# Patient Record
Sex: MALE | Race: OTHER | Employment: UNEMPLOYED | URBAN - METROPOLITAN AREA
[De-identification: names, ages, dates, MRNs, and addresses within clinical notes are randomized per-mention and may not be internally consistent; named-entity substitution may affect disease eponyms.]

---

## 2020-07-07 ENCOUNTER — APPOINTMENT (OUTPATIENT)
Dept: GENERAL RADIOLOGY | Age: 45
End: 2020-07-07

## 2020-07-07 ENCOUNTER — HOSPITAL ENCOUNTER (EMERGENCY)
Age: 45
Discharge: HOME | End: 2020-07-07
Attending: EMERGENCY MEDICINE

## 2020-07-07 VITALS
TEMPERATURE: 98.2 F | OXYGEN SATURATION: 100 % | HEART RATE: 60 BPM | RESPIRATION RATE: 16 BRPM | SYSTOLIC BLOOD PRESSURE: 133 MMHG | DIASTOLIC BLOOD PRESSURE: 96 MMHG

## 2020-07-07 LAB
GLUCOSE BLD-MCNC: 110 MG/DL (ref 70–99)
PERFORMED ON: ABNORMAL

## 2020-07-07 PROCEDURE — 73560 X-RAY EXAM OF KNEE 1 OR 2: CPT

## 2020-07-07 PROCEDURE — 6370000000 HC RX 637 (ALT 250 FOR IP): Performed by: STUDENT IN AN ORGANIZED HEALTH CARE EDUCATION/TRAINING PROGRAM

## 2020-07-07 PROCEDURE — 99283 EMERGENCY DEPT VISIT LOW MDM: CPT

## 2020-07-07 PROCEDURE — 6360000002 HC RX W HCPCS: Performed by: STUDENT IN AN ORGANIZED HEALTH CARE EDUCATION/TRAINING PROGRAM

## 2020-07-07 PROCEDURE — 73590 X-RAY EXAM OF LOWER LEG: CPT

## 2020-07-07 PROCEDURE — 96372 THER/PROPH/DIAG INJ SC/IM: CPT

## 2020-07-07 PROCEDURE — 73610 X-RAY EXAM OF ANKLE: CPT

## 2020-07-07 RX ORDER — IBUPROFEN 800 MG/1
800 TABLET ORAL EVERY 8 HOURS PRN
Qty: 30 TABLET | Refills: 0 | Status: SHIPPED | OUTPATIENT
Start: 2020-07-07

## 2020-07-07 RX ORDER — ACETAMINOPHEN 325 MG/1
650 TABLET ORAL ONCE
Status: COMPLETED | OUTPATIENT
Start: 2020-07-07 | End: 2020-07-07

## 2020-07-07 RX ORDER — CAPSAICIN 0.025 %
CREAM (GRAM) TOPICAL
Qty: 50 G | Refills: 1 | Status: SHIPPED | OUTPATIENT
Start: 2020-07-07 | End: 2020-08-06

## 2020-07-07 RX ORDER — KETOROLAC TROMETHAMINE 30 MG/ML
15 INJECTION, SOLUTION INTRAMUSCULAR; INTRAVENOUS ONCE
Status: COMPLETED | OUTPATIENT
Start: 2020-07-07 | End: 2020-07-07

## 2020-07-07 RX ADMIN — ACETAMINOPHEN 650 MG: 325 TABLET ORAL at 16:15

## 2020-07-07 RX ADMIN — KETOROLAC TROMETHAMINE 15 MG: 30 INJECTION, SOLUTION INTRAMUSCULAR at 16:15

## 2020-07-07 ASSESSMENT — PAIN DESCRIPTION - FREQUENCY: FREQUENCY: CONTINUOUS

## 2020-07-07 ASSESSMENT — PAIN DESCRIPTION - ORIENTATION
ORIENTATION: RIGHT
ORIENTATION: RIGHT

## 2020-07-07 ASSESSMENT — PAIN - FUNCTIONAL ASSESSMENT: PAIN_FUNCTIONAL_ASSESSMENT: ACTIVITIES ARE NOT PREVENTED

## 2020-07-07 ASSESSMENT — PAIN SCALES - GENERAL
PAINLEVEL_OUTOF10: 8
PAINLEVEL_OUTOF10: 2
PAINLEVEL_OUTOF10: 8

## 2020-07-07 ASSESSMENT — PAIN DESCRIPTION - PROGRESSION: CLINICAL_PROGRESSION: GRADUALLY WORSENING

## 2020-07-07 ASSESSMENT — PAIN DESCRIPTION - LOCATION
LOCATION: LEG
LOCATION: LEG

## 2020-07-07 ASSESSMENT — PAIN DESCRIPTION - DESCRIPTORS: DESCRIPTORS: PINS AND NEEDLES;BURNING

## 2020-07-07 ASSESSMENT — PAIN DESCRIPTION - PAIN TYPE: TYPE: ACUTE PAIN

## 2020-07-07 ASSESSMENT — PAIN DESCRIPTION - ONSET: ONSET: GRADUAL

## 2020-07-07 NOTE — ED NOTES
Pt resting in bed no needs at this time will continue to monitor.       Devante Mattson RN  07/07/20 5668

## 2020-07-07 NOTE — ED PROVIDER NOTES
reports previous drug use. Medications     Discharge Medication List as of 7/7/2020  5:34 PM          Allergies     He has No Known Allergies. Physical Exam     INITIAL VITALS: BP: 128/86, Temp: 98.2 °F (36.8 °C), Pulse: 69, Resp: 18, SpO2: 100 %     General:  Nontoxic appearing, well-developed, well-nourished male     HEENT:  Normocephalic, atraumatic     Eyes: Anicteric, EOMI, PERRLA     Neck:  Supple, full ROM    Pulmonary:   Clear to auscultation bilaterally, no wheezes, rales, rhonchi    Cardiac:  Regular rate and rhythm, no murmurs, gallops, or rubs     Abdomen:  Soft, nondistended, nontender. No masses. No guarding, no rebound     Extremities:  Warm, 2+ radial pulses, 2+ DP pulses. The R anterior knee is TTP along the inferior aspect. No point tenderness. No obvious deformity. No warmth, redness, effusion. Popliteal fossa on the affected side without swelling. Strength 5/5 in the affected extremity in all joints on flexion and extension. FROM on passive ranging without significant pain, mild crepitus palpated on motion ranging. Ambulatory with normal gait, able to bear entire body weight on RLE. LLE normal. Feet normal bilaterally without wounds or lacerations. Skin: No rashes or bruises    Neuro:   Awake, alert, moving UE and LE spontaneously    Psych:   Mood and affect appropriate, answering questions appropriately      Diagnostic Results     RADIOLOGY:  XR ANKLE RIGHT (MIN 3 VIEWS)   Final Result      Right tibia and fibula:   No acute osseous abnormality. Right ankle:   No acute osseous abnormality. XR TIBIA FIBULA RIGHT (2 VIEWS)   Final Result      Right tibia and fibula:   No acute osseous abnormality. Right ankle:   No acute osseous abnormality. XR KNEE RIGHT (1-2 VIEWS)   Final Result      Frontal and lateral views demonstrate mild tricompartmental osteoarthritis. No fracture or effusion.           LABS:   Results for orders placed or performed during the hospital encounter of 07/07/20   POCT Glucose   Result Value Ref Range    POC Glucose 110 (H) 70 - 99 mg/dl    Performed on ACCU-CHEK        RECENT VITALS:  BP: (!) 133/96, Temp: 98.2 °F (36.8 °C), Pulse: 60, Resp: 16, SpO2: 100 %     Procedures     Procedures    ED Course     Nursing Notes, Past Medical Hx, Past Surgical Hx, Social Hx, Allergies, and Family Hx were reviewed. The patient was given the following medications:  Orders Placed This Encounter   Medications    acetaminophen (TYLENOL) tablet 650 mg    ketorolac (TORADOL) injection 15 mg    ibuprofen (ADVIL;MOTRIN) 800 MG tablet     Sig: Take 1 tablet by mouth every 8 hours as needed for Pain     Dispense:  30 tablet     Refill:  0    capsaicin (ZOSTRIX) 0.025 % cream     Sig: Apply topically 2 times daily. Dispense:  50 g     Refill:  1       CONSULTS:  None    MEDICAL DECISION MAKING / ASSESSMENT / John Jennifer is a 53 y.o. male with a history and presentation as described above in HPI. The patient was evaluated by myself and the ED Attending Physician, Dr. Kadeem Bains. All management and disposition plans were discussed and agreed upon. Appropriate labs and diagnostic studies were reviewed as they were made available. Pertinent laboratory studies in medical decision making are listed below. Upon presentation, the patient was evaluated by me. He is overall HDS and well appearing. In terms of his knee pain, his hx and physical do not support a dx of fracture, septic joint, ligament instability, or other more malignant etiology. XRs normal, likely arthritis that warrants conservative mgmt at this time. He is neurovascularly intact making a neuro process or vascular etiology less likely. In terms of his medications, his BP is normal today as is his sugar. I do not feel comfortable prescribing refills of these meds in the ED setting given that I cannot follow him.  I discussed with him and his family member the importance of obtaining a PCP if he is staying here in [place]. This physician will be able to follow him longitudinally, they voice understanding and agreement, referral placed. We controlled his pain with Tylenol and Toradol. Risks, benefits, and alternatives were discussed. At this time the patient has been deemed safe for discharge. The patient is well-appearing, afebrile and hemodynamically stable. My customary discharge instructions including strict return precautions for worsening or new symptoms have been communicated. Specific concerns for this pt include trauma, redness, effusion, inability to bear weight. The patient was prescribed: capsaicin cream, ibuprofen  The patient was advised to follow up with PCP at [address] [place] [address]    Clinical Impression     1. Arthritis        Disposition     PATIENT REFERRED TO:  No follow-up provider specified. DISCHARGE MEDICATIONS:  Discharge Medication List as of 7/7/2020  5:34 PM      START taking these medications    Details   ibuprofen (ADVIL;MOTRIN) 800 MG tablet Take 1 tablet by mouth every 8 hours as needed for Pain, Disp-30 tablet, R-0Print      capsaicin (ZOSTRIX) 0.025 % cream Apply topically 2 times daily. , Disp-50 g, R-1, Print             DISPOSITION Decision To Discharge 07/07/2020 05:29:08 PM          Narendra Ramirez MD  Resident  07/09/20 6844

## 2020-07-07 NOTE — ED TRIAGE NOTES
Pt came in with complaints of R leg pain that goes up the leg, feels like burning and pins and needles, rates pain an 8.  States it started 10 days ago and is getting worse

## 2020-07-07 NOTE — ED PROVIDER NOTES
ED Attending Attestation Note     Date of evaluation: 7/7/2020    This patient was seen by the resident. I have seen and examined the patient, agree with the workup, evaluation, management and diagnosis. The care plan has been discussed. My assessment reveals significantly improved pain in the right knee radiating down to the leg. The knee does not appear grossly unstable. No tenderness to palpation of the knee, no real difficulty in ranging the knee actively or passively. I do appreciate some cracking noises with minute nebulized in the knee, likely due to arthritic changes. Radiographs reveal likely tricompartmental arthritis. His story of worsening pain in the morning with improvement through the day and a joint associated with a previous injury is consistent with likely arthritis. We will give him NSAIDs, capsaicin cream, and follow-up with his PCP and with [facility] clinic to establish PCP. Sanjana Tinajero MD  07/07/20 7442

## 2020-07-30 ENCOUNTER — HOSPITAL ENCOUNTER (EMERGENCY)
Age: 45
Discharge: HOME | End: 2020-07-30
Attending: EMERGENCY MEDICINE

## 2020-07-30 VITALS
TEMPERATURE: 97.8 F | HEART RATE: 77 BPM | RESPIRATION RATE: 18 BRPM | DIASTOLIC BLOOD PRESSURE: 88 MMHG | SYSTOLIC BLOOD PRESSURE: 123 MMHG | OXYGEN SATURATION: 100 %

## 2020-07-30 LAB
GLUCOSE BLD-MCNC: 202 MG/DL (ref 70–99)
PERFORMED ON: ABNORMAL

## 2020-07-30 PROCEDURE — 99283 EMERGENCY DEPT VISIT LOW MDM: CPT

## 2020-07-30 ASSESSMENT — PAIN DESCRIPTION - PAIN TYPE: TYPE: ACUTE PAIN

## 2020-07-30 ASSESSMENT — PAIN DESCRIPTION - ORIENTATION: ORIENTATION: RIGHT

## 2020-07-30 ASSESSMENT — PAIN DESCRIPTION - LOCATION: LOCATION: LEG

## 2020-07-30 ASSESSMENT — ENCOUNTER SYMPTOMS
SHORTNESS OF BREATH: 0
ABDOMINAL PAIN: 0
EYE PAIN: 0
WHEEZING: 0
VOMITING: 0
NAUSEA: 0
SORE THROAT: 0

## 2020-07-30 ASSESSMENT — PAIN SCALES - GENERAL: PAINLEVEL_OUTOF10: 8

## 2020-07-30 ASSESSMENT — PAIN DESCRIPTION - DESCRIPTORS: DESCRIPTORS: ACHING
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
## 2020-07-30 NOTE — ED NOTES
Discharge instructions reviewed with pt and his spouse. They verbalized understanding. Called [facility] resident clinic and left a message explaining his situation and requesting follow-up. ACE wrap applied to pt's right knee for additional support.      Aden Guajardo RN  07/30/20 3032

## 2020-07-30 NOTE — ED TRIAGE NOTES
PT TO ED FOR RIGHT SIDED LEG PAIN. PT STATES HE WAS SEEN ON 7/7. PT STATES HE ONLY WANTS MEDICATION. PT DENIES TRAUMA. PT STATES HE ONLY WANTS MEDICATION AND DOES NOT WANT XRAY.

## 2023-02-24 ENCOUNTER — HOSPITAL ENCOUNTER (INPATIENT)
Age: 48
LOS: 1 days | Discharge: HOME | DRG: 871 | End: 2023-02-25
Attending: EMERGENCY MEDICINE | Admitting: STUDENT IN AN ORGANIZED HEALTH CARE EDUCATION/TRAINING PROGRAM

## 2023-02-24 ENCOUNTER — APPOINTMENT (OUTPATIENT)
Dept: GENERAL RADIOLOGY | Age: 48
DRG: 871 | End: 2023-02-24

## 2023-02-24 ENCOUNTER — APPOINTMENT (OUTPATIENT)
Dept: CT IMAGING | Age: 48
DRG: 871 | End: 2023-02-24

## 2023-02-24 DIAGNOSIS — R77.8 ELEVATED TROPONIN: ICD-10-CM

## 2023-02-24 DIAGNOSIS — G93.40 ACUTE ENCEPHALOPATHY: ICD-10-CM

## 2023-02-24 DIAGNOSIS — E87.6 HYPOKALEMIA: ICD-10-CM

## 2023-02-24 DIAGNOSIS — N17.9 ACUTE KIDNEY INJURY (HCC): ICD-10-CM

## 2023-02-24 DIAGNOSIS — F10.929 ACUTE ALCOHOLIC INTOXICATION WITH COMPLICATION (HCC): Primary | ICD-10-CM

## 2023-02-24 DIAGNOSIS — E87.20 LACTIC ACIDOSIS: ICD-10-CM

## 2023-02-24 DIAGNOSIS — D72.829 LEUKOCYTOSIS, UNSPECIFIED TYPE: ICD-10-CM

## 2023-02-24 PROBLEM — R65.20 SEVERE SEPSIS (HCC): Status: ACTIVE | Noted: 2023-02-24
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
## 2023-02-24 PROBLEM — E11.59 HYPERTENSION ASSOCIATED WITH DIABETES (HCC): Status: ACTIVE | Noted: 2023-02-24

## 2023-02-24 PROBLEM — I15.2 HYPERTENSION ASSOCIATED WITH DIABETES (HCC): Status: ACTIVE | Noted: 2023-02-24

## 2023-02-24 PROBLEM — E78.5 HYPERLIPIDEMIA ASSOCIATED WITH TYPE 2 DIABETES MELLITUS (HCC): Status: ACTIVE | Noted: 2023-02-24

## 2023-02-24 PROBLEM — E11.9 TYPE 2 DIABETES MELLITUS (HCC): Status: ACTIVE | Noted: 2023-02-24

## 2023-02-24 PROBLEM — E11.69 HYPERLIPIDEMIA ASSOCIATED WITH TYPE 2 DIABETES MELLITUS (HCC): Status: ACTIVE | Noted: 2023-02-24

## 2023-02-24 PROBLEM — A41.9 SEPSIS (HCC): Status: ACTIVE | Noted: 2023-02-24

## 2023-02-24 PROBLEM — G93.41 ACUTE METABOLIC ENCEPHALOPATHY: Status: ACTIVE | Noted: 2023-02-24

## 2023-02-24 LAB
A/G RATIO: 1.2 (ref 1.1–2.2)
ACETAMINOPHEN LEVEL: <5 UG/ML (ref 10–30)
ALBUMIN SERPL-MCNC: 3.9 G/DL (ref 3.4–5)
ALBUMIN SERPL-MCNC: 4.2 G/DL (ref 3.4–5)
ALP BLD-CCNC: 65 U/L (ref 40–129)
ALT SERPL-CCNC: 10 U/L (ref 10–40)
AMMONIA: 29 UMOL/L (ref 16–60)
ANION GAP SERPL CALCULATED.3IONS-SCNC: 23 MMOL/L (ref 3–16)
ANION GAP SERPL CALCULATED.3IONS-SCNC: 30 MMOL/L (ref 3–16)
APTT: 26.5 SEC (ref 23–34.3)
AST SERPL-CCNC: 15 U/L (ref 15–37)
BANDED NEUTROPHILS RELATIVE PERCENT: 1 % (ref 0–7)
BASE EXCESS VENOUS: -8.3 MMOL/L (ref -3–3)
BASOPHILS ABSOLUTE: 0 K/UL (ref 0–0.2)
BASOPHILS ABSOLUTE: 0 K/UL (ref 0–0.2)
BASOPHILS RELATIVE PERCENT: 0 %
BASOPHILS RELATIVE PERCENT: 0.1 %
BETA-HYDROXYBUTYRATE: 0.4 MMOL/L (ref 0–0.27)
BILIRUB SERPL-MCNC: <0.2 MG/DL (ref 0–1)
BUN BLDV-MCNC: 20 MG/DL (ref 7–20)
BUN BLDV-MCNC: 20 MG/DL (ref 7–20)
CALCIUM SERPL-MCNC: 9.2 MG/DL (ref 8.3–10.6)
CALCIUM SERPL-MCNC: 9.5 MG/DL (ref 8.3–10.6)
CARBOXYHEMOGLOBIN: 3.3 % (ref 0–1.5)
CHLORIDE BLD-SCNC: 91 MMOL/L (ref 99–110)
CHLORIDE BLD-SCNC: 97 MMOL/L (ref 99–110)
CHOLESTEROL, TOTAL: 179 MG/DL (ref 0–199)
CO2: 20 MMOL/L (ref 21–32)
CO2: 20 MMOL/L (ref 21–32)
CREAT SERPL-MCNC: 1.6 MG/DL (ref 0.9–1.3)
CREAT SERPL-MCNC: 2 MG/DL (ref 0.9–1.3)
EKG ATRIAL RATE: 127 BPM
EKG DIAGNOSIS: NORMAL
EKG P AXIS: 69 DEGREES
EKG P-R INTERVAL: 150 MS
EKG Q-T INTERVAL: 322 MS
EKG QRS DURATION: 102 MS
EKG QTC CALCULATION (BAZETT): 467 MS
EKG R AXIS: 63 DEGREES
EKG T AXIS: -10 DEGREES
EKG VENTRICULAR RATE: 127 BPM
EOSINOPHILS ABSOLUTE: 0 K/UL (ref 0–0.6)
EOSINOPHILS ABSOLUTE: 0 K/UL (ref 0–0.6)
EOSINOPHILS RELATIVE PERCENT: 0 %
EOSINOPHILS RELATIVE PERCENT: 0 %
ETHANOL: 196 MG/DL (ref 0–0.08)
GFR SERPL CREATININE-BSD FRML MDRD: 40 ML/MIN/{1.73_M2}
GFR SERPL CREATININE-BSD FRML MDRD: 52 ML/MIN/{1.73_M2}
GLUCOSE BLD-MCNC: 179 MG/DL (ref 70–99)
GLUCOSE BLD-MCNC: 191 MG/DL (ref 70–99)
GLUCOSE BLD-MCNC: 211 MG/DL (ref 70–99)
GLUCOSE BLD-MCNC: 293 MG/DL (ref 70–99)
GLUCOSE BLD-MCNC: 331 MG/DL (ref 70–99)
GLUCOSE BLD-MCNC: 375 MG/DL (ref 70–99)
GLUCOSE BLD-MCNC: 475 MG/DL (ref 70–99)
HCO3 VENOUS: 18.6 MMOL/L (ref 23–29)
HCT VFR BLD CALC: 41.1 % (ref 40.5–52.5)
HCT VFR BLD CALC: 42.5 % (ref 40.5–52.5)
HCT VFR BLD CALC: 43.8 % (ref 40.5–52.5)
HDLC SERPL-MCNC: 48 MG/DL (ref 40–60)
HEMATOLOGY PATH CONSULT: NORMAL
HEMATOLOGY PATH CONSULT: YES
HEMOGLOBIN, VEN, REDUCED: 9 %
HEMOGLOBIN: 13.5 G/DL (ref 13.5–17.5)
HEMOGLOBIN: 13.7 G/DL (ref 13.5–17.5)
HEMOGLOBIN: 14.5 G/DL (ref 13.5–17.5)
INR BLD: 1.05 (ref 0.87–1.14)
LACTIC ACID: 12.5 MMOL/L (ref 0.4–2)
LACTIC ACID: 14.2 MMOL/L (ref 0.4–2)
LACTIC ACID: 15.4 MMOL/L (ref 0.4–2)
LACTIC ACID: 9.3 MMOL/L (ref 0.4–2)
LDL CHOLESTEROL CALCULATED: 108 MG/DL
LIPASE: 29 U/L (ref 13–60)
LYMPHOCYTES ABSOLUTE: 1.4 K/UL (ref 1–5.1)
LYMPHOCYTES ABSOLUTE: 1.7 K/UL (ref 1–5.1)
LYMPHOCYTES RELATIVE PERCENT: 5.8 %
LYMPHOCYTES RELATIVE PERCENT: 6 %
MAGNESIUM: 2.2 MG/DL (ref 1.8–2.4)
MAGNESIUM: 2.4 MG/DL (ref 1.8–2.4)
MCH RBC QN AUTO: 28.9 PG (ref 26–34)
MCH RBC QN AUTO: 29.6 PG (ref 26–34)
MCHC RBC AUTO-ENTMCNC: 32.3 G/DL (ref 31–36)
MCHC RBC AUTO-ENTMCNC: 33.2 G/DL (ref 31–36)
MCV RBC AUTO: 89.3 FL (ref 80–100)
MCV RBC AUTO: 89.5 FL (ref 80–100)
METHEMOGLOBIN VENOUS: 0 %
MONOCYTES ABSOLUTE: 2.4 K/UL (ref 0–1.3)
MONOCYTES ABSOLUTE: 4.9 K/UL (ref 0–1.3)
MONOCYTES RELATIVE PERCENT: 10.3 %
MONOCYTES RELATIVE PERCENT: 17 %
NEUTROPHILS ABSOLUTE: 19.6 K/UL (ref 1.7–7.7)
NEUTROPHILS ABSOLUTE: 22.1 K/UL (ref 1.7–7.7)
NEUTROPHILS RELATIVE PERCENT: 76 %
NEUTROPHILS RELATIVE PERCENT: 83.8 %
O2 CONTENT, VEN: 18 VOL %
O2 SAT, VEN: 91 %
O2 THERAPY: ABNORMAL
PCO2, VEN: 42.3 MMHG (ref 40–50)
PDW BLD-RTO: 13.3 % (ref 12.4–15.4)
PDW BLD-RTO: 13.5 % (ref 12.4–15.4)
PERFORMED ON: ABNORMAL
PH VENOUS: 7.25 (ref 7.35–7.45)
PHOSPHORUS: 3.3 MG/DL (ref 2.5–4.9)
PLATELET # BLD: 260 K/UL (ref 135–450)
PLATELET # BLD: 308 K/UL (ref 135–450)
PMV BLD AUTO: 7.9 FL (ref 5–10.5)
PMV BLD AUTO: 8.7 FL (ref 5–10.5)
PO2, VEN: 69.3 MMHG (ref 25–40)
POTASSIUM SERPL-SCNC: 2.8 MMOL/L (ref 3.5–5.1)
POTASSIUM SERPL-SCNC: 4.2 MMOL/L (ref 3.5–5.1)
PROCALCITONIN: 12.42 NG/ML (ref 0–0.15)
PROTHROMBIN TIME: 13.6 SEC (ref 11.7–14.5)
RBC # BLD: 4.75 M/UL (ref 4.2–5.9)
RBC # BLD: 4.91 M/UL (ref 4.2–5.9)
RBC # BLD: NORMAL 10*6/UL
SALICYLATE, SERUM: <0.3 MG/DL (ref 15–30)
SODIUM BLD-SCNC: 140 MMOL/L (ref 136–145)
SODIUM BLD-SCNC: 141 MMOL/L (ref 136–145)
TCO2 CALC VENOUS: 45 MMOL/L
TOTAL PROTEIN: 7.7 G/DL (ref 6.4–8.2)
TRIGL SERPL-MCNC: 115 MG/DL (ref 0–150)
TROPONIN: 0.02 NG/ML
TSH REFLEX: 0.69 UIU/ML (ref 0.27–4.2)
VLDLC SERPL CALC-MCNC: 23 MG/DL
WBC # BLD: 23.4 K/UL (ref 4–11)
WBC # BLD: 28.7 K/UL (ref 4–11)

## 2023-02-24 PROCEDURE — 6360000002 HC RX W HCPCS: Performed by: STUDENT IN AN ORGANIZED HEALTH CARE EDUCATION/TRAINING PROGRAM

## 2023-02-24 PROCEDURE — 87641 MR-STAPH DNA AMP PROBE: CPT

## 2023-02-24 PROCEDURE — 96360 HYDRATION IV INFUSION INIT: CPT

## 2023-02-24 PROCEDURE — 82803 BLOOD GASES ANY COMBINATION: CPT

## 2023-02-24 PROCEDURE — 2580000003 HC RX 258: Performed by: EMERGENCY MEDICINE

## 2023-02-24 PROCEDURE — 72125 CT NECK SPINE W/O DYE: CPT

## 2023-02-24 PROCEDURE — 97165 OT EVAL LOW COMPLEX 30 MIN: CPT

## 2023-02-24 PROCEDURE — 96365 THER/PROPH/DIAG IV INF INIT: CPT

## 2023-02-24 PROCEDURE — 84443 ASSAY THYROID STIM HORMONE: CPT

## 2023-02-24 PROCEDURE — 83605 ASSAY OF LACTIC ACID: CPT

## 2023-02-24 PROCEDURE — 2580000003 HC RX 258: Performed by: PHYSICIAN ASSISTANT

## 2023-02-24 PROCEDURE — 6360000002 HC RX W HCPCS: Performed by: EMERGENCY MEDICINE

## 2023-02-24 PROCEDURE — 36415 COLL VENOUS BLD VENIPUNCTURE: CPT

## 2023-02-24 PROCEDURE — 82140 ASSAY OF AMMONIA: CPT

## 2023-02-24 PROCEDURE — 80179 DRUG ASSAY SALICYLATE: CPT

## 2023-02-24 PROCEDURE — 84145 PROCALCITONIN (PCT): CPT

## 2023-02-24 PROCEDURE — 70450 CT HEAD/BRAIN W/O DYE: CPT

## 2023-02-24 PROCEDURE — 96375 TX/PRO/DX INJ NEW DRUG ADDON: CPT

## 2023-02-24 PROCEDURE — 85014 HEMATOCRIT: CPT

## 2023-02-24 PROCEDURE — 85730 THROMBOPLASTIN TIME PARTIAL: CPT

## 2023-02-24 PROCEDURE — C9113 INJ PANTOPRAZOLE SODIUM, VIA: HCPCS | Performed by: STUDENT IN AN ORGANIZED HEALTH CARE EDUCATION/TRAINING PROGRAM

## 2023-02-24 PROCEDURE — 80061 LIPID PANEL: CPT

## 2023-02-24 PROCEDURE — 97116 GAIT TRAINING THERAPY: CPT

## 2023-02-24 PROCEDURE — 85025 COMPLETE CBC W/AUTO DIFF WBC: CPT

## 2023-02-24 PROCEDURE — 99285 EMERGENCY DEPT VISIT HI MDM: CPT

## 2023-02-24 PROCEDURE — 83036 HEMOGLOBIN GLYCOSYLATED A1C: CPT

## 2023-02-24 PROCEDURE — 85018 HEMOGLOBIN: CPT

## 2023-02-24 PROCEDURE — 84484 ASSAY OF TROPONIN QUANT: CPT

## 2023-02-24 PROCEDURE — 97161 PT EVAL LOW COMPLEX 20 MIN: CPT

## 2023-02-24 PROCEDURE — 85610 PROTHROMBIN TIME: CPT

## 2023-02-24 PROCEDURE — 96361 HYDRATE IV INFUSION ADD-ON: CPT

## 2023-02-24 PROCEDURE — C9113 INJ PANTOPRAZOLE SODIUM, VIA: HCPCS | Performed by: PHYSICIAN ASSISTANT

## 2023-02-24 PROCEDURE — 80143 DRUG ASSAY ACETAMINOPHEN: CPT

## 2023-02-24 PROCEDURE — 2580000003 HC RX 258: Performed by: INTERNAL MEDICINE

## 2023-02-24 PROCEDURE — 71045 X-RAY EXAM CHEST 1 VIEW: CPT

## 2023-02-24 PROCEDURE — 87040 BLOOD CULTURE FOR BACTERIA: CPT

## 2023-02-24 PROCEDURE — 82010 KETONE BODYS QUAN: CPT

## 2023-02-24 PROCEDURE — 2580000003 HC RX 258: Performed by: STUDENT IN AN ORGANIZED HEALTH CARE EDUCATION/TRAINING PROGRAM

## 2023-02-24 PROCEDURE — 2000000000 HC ICU R&B

## 2023-02-24 PROCEDURE — 71250 CT THORAX DX C-: CPT

## 2023-02-24 PROCEDURE — 93005 ELECTROCARDIOGRAM TRACING: CPT | Performed by: EMERGENCY MEDICINE

## 2023-02-24 PROCEDURE — 83735 ASSAY OF MAGNESIUM: CPT

## 2023-02-24 PROCEDURE — 93010 ELECTROCARDIOGRAM REPORT: CPT | Performed by: INTERNAL MEDICINE

## 2023-02-24 PROCEDURE — 6360000002 HC RX W HCPCS: Performed by: PHYSICIAN ASSISTANT

## 2023-02-24 PROCEDURE — 82077 ASSAY SPEC XCP UR&BREATH IA: CPT

## 2023-02-24 PROCEDURE — 83690 ASSAY OF LIPASE: CPT

## 2023-02-24 PROCEDURE — 6370000000 HC RX 637 (ALT 250 FOR IP): Performed by: STUDENT IN AN ORGANIZED HEALTH CARE EDUCATION/TRAINING PROGRAM

## 2023-02-24 PROCEDURE — 2500000003 HC RX 250 WO HCPCS: Performed by: STUDENT IN AN ORGANIZED HEALTH CARE EDUCATION/TRAINING PROGRAM

## 2023-02-24 PROCEDURE — 80053 COMPREHEN METABOLIC PANEL: CPT

## 2023-02-24 RX ORDER — PANTOPRAZOLE SODIUM 40 MG/10ML
40 INJECTION, POWDER, LYOPHILIZED, FOR SOLUTION INTRAVENOUS DAILY
Status: DISCONTINUED | OUTPATIENT
Start: 2023-02-24 | End: 2023-02-25 | Stop reason: HOSPADM

## 2023-02-24 RX ORDER — SODIUM CHLORIDE, SODIUM LACTATE, POTASSIUM CHLORIDE, AND CALCIUM CHLORIDE .6; .31; .03; .02 G/100ML; G/100ML; G/100ML; G/100ML
1500 INJECTION, SOLUTION INTRAVENOUS ONCE
Status: COMPLETED | OUTPATIENT
Start: 2023-02-24 | End: 2023-02-24

## 2023-02-24 RX ORDER — POTASSIUM CHLORIDE 7.45 MG/ML
10 INJECTION INTRAVENOUS PRN
Status: DISCONTINUED | OUTPATIENT
Start: 2023-02-24 | End: 2023-02-25 | Stop reason: HOSPADM

## 2023-02-24 RX ORDER — SODIUM CHLORIDE 9 MG/ML
INJECTION, SOLUTION INTRAVENOUS PRN
Status: DISCONTINUED | OUTPATIENT
Start: 2023-02-24 | End: 2023-02-25 | Stop reason: HOSPADM

## 2023-02-24 RX ORDER — ONDANSETRON 2 MG/ML
4 INJECTION INTRAMUSCULAR; INTRAVENOUS EVERY 6 HOURS PRN
Status: DISCONTINUED | OUTPATIENT
Start: 2023-02-24 | End: 2023-02-25 | Stop reason: HOSPADM

## 2023-02-24 RX ORDER — ONDANSETRON 2 MG/ML
4 INJECTION INTRAMUSCULAR; INTRAVENOUS EVERY 6 HOURS PRN
Status: DISCONTINUED | OUTPATIENT
Start: 2023-02-24 | End: 2023-02-24

## 2023-02-24 RX ORDER — SODIUM CHLORIDE 0.9 % (FLUSH) 0.9 %
5-40 SYRINGE (ML) INJECTION PRN
Status: DISCONTINUED | OUTPATIENT
Start: 2023-02-24 | End: 2023-02-25 | Stop reason: HOSPADM

## 2023-02-24 RX ORDER — MAGNESIUM SULFATE IN WATER 40 MG/ML
2000 INJECTION, SOLUTION INTRAVENOUS PRN
Status: DISCONTINUED | OUTPATIENT
Start: 2023-02-24 | End: 2023-02-25 | Stop reason: HOSPADM

## 2023-02-24 RX ORDER — INSULIN LISPRO 100 [IU]/ML
0-8 INJECTION, SOLUTION INTRAVENOUS; SUBCUTANEOUS EVERY 4 HOURS
Status: DISCONTINUED | OUTPATIENT
Start: 2023-02-24 | End: 2023-02-25 | Stop reason: HOSPADM

## 2023-02-24 RX ORDER — SODIUM CHLORIDE 0.9 % (FLUSH) 0.9 %
5-40 SYRINGE (ML) INJECTION EVERY 12 HOURS SCHEDULED
Status: DISCONTINUED | OUTPATIENT
Start: 2023-02-24 | End: 2023-02-25 | Stop reason: HOSPADM

## 2023-02-24 RX ORDER — POTASSIUM CHLORIDE 20 MEQ/1
40 TABLET, EXTENDED RELEASE ORAL PRN
Status: DISCONTINUED | OUTPATIENT
Start: 2023-02-24 | End: 2023-02-25 | Stop reason: HOSPADM

## 2023-02-24 RX ORDER — POTASSIUM CHLORIDE 7.45 MG/ML
10 INJECTION INTRAVENOUS
Status: ACTIVE | OUTPATIENT
Start: 2023-02-24 | End: 2023-02-24

## 2023-02-24 RX ORDER — ACETAMINOPHEN 325 MG/1
650 TABLET ORAL EVERY 6 HOURS PRN
Status: DISCONTINUED | OUTPATIENT
Start: 2023-02-24 | End: 2023-02-25 | Stop reason: HOSPADM

## 2023-02-24 RX ORDER — INSULIN GLARGINE 100 [IU]/ML
10 INJECTION, SOLUTION SUBCUTANEOUS DAILY
Status: DISCONTINUED | OUTPATIENT
Start: 2023-02-24 | End: 2023-02-25 | Stop reason: HOSPADM

## 2023-02-24 RX ORDER — SODIUM CHLORIDE, SODIUM LACTATE, POTASSIUM CHLORIDE, AND CALCIUM CHLORIDE .6; .31; .03; .02 G/100ML; G/100ML; G/100ML; G/100ML
1000 INJECTION, SOLUTION INTRAVENOUS ONCE
Status: COMPLETED | OUTPATIENT
Start: 2023-02-24 | End: 2023-02-24

## 2023-02-24 RX ORDER — BENZONATATE 100 MG/1
100 CAPSULE ORAL 3 TIMES DAILY PRN
Status: DISCONTINUED | OUTPATIENT
Start: 2023-02-24 | End: 2023-02-25 | Stop reason: HOSPADM

## 2023-02-24 RX ORDER — ONDANSETRON 4 MG/1
4 TABLET, ORALLY DISINTEGRATING ORAL EVERY 8 HOURS PRN
Status: DISCONTINUED | OUTPATIENT
Start: 2023-02-24 | End: 2023-02-25 | Stop reason: HOSPADM

## 2023-02-24 RX ORDER — THIAMINE HYDROCHLORIDE 100 MG/ML
200 INJECTION, SOLUTION INTRAMUSCULAR; INTRAVENOUS DAILY
Status: DISCONTINUED | OUTPATIENT
Start: 2023-02-24 | End: 2023-02-25 | Stop reason: HOSPADM

## 2023-02-24 RX ORDER — METRONIDAZOLE 500 MG/100ML
500 INJECTION, SOLUTION INTRAVENOUS EVERY 8 HOURS
Status: DISCONTINUED | OUTPATIENT
Start: 2023-02-24 | End: 2023-02-25 | Stop reason: HOSPADM

## 2023-02-24 RX ORDER — POTASSIUM CHLORIDE 7.45 MG/ML
10 INJECTION INTRAVENOUS
Status: COMPLETED | OUTPATIENT
Start: 2023-02-24 | End: 2023-02-24

## 2023-02-24 RX ORDER — DEXTROSE MONOHYDRATE 100 MG/ML
INJECTION, SOLUTION INTRAVENOUS CONTINUOUS PRN
Status: DISCONTINUED | OUTPATIENT
Start: 2023-02-24 | End: 2023-02-25 | Stop reason: HOSPADM

## 2023-02-24 RX ORDER — SODIUM CHLORIDE, SODIUM LACTATE, POTASSIUM CHLORIDE, CALCIUM CHLORIDE 600; 310; 30; 20 MG/100ML; MG/100ML; MG/100ML; MG/100ML
INJECTION, SOLUTION INTRAVENOUS CONTINUOUS
Status: DISCONTINUED | OUTPATIENT
Start: 2023-02-24 | End: 2023-02-25

## 2023-02-24 RX ORDER — GUAIFENESIN 600 MG/1
600 TABLET, EXTENDED RELEASE ORAL 2 TIMES DAILY PRN
Status: DISCONTINUED | OUTPATIENT
Start: 2023-02-24 | End: 2023-02-25 | Stop reason: HOSPADM

## 2023-02-24 RX ORDER — PANTOPRAZOLE SODIUM 40 MG/10ML
80 INJECTION, POWDER, LYOPHILIZED, FOR SOLUTION INTRAVENOUS ONCE
Status: COMPLETED | OUTPATIENT
Start: 2023-02-24 | End: 2023-02-24

## 2023-02-24 RX ORDER — POLYETHYLENE GLYCOL 3350 17 G/17G
17 POWDER, FOR SOLUTION ORAL DAILY PRN
Status: DISCONTINUED | OUTPATIENT
Start: 2023-02-24 | End: 2023-02-25 | Stop reason: HOSPADM

## 2023-02-24 RX ORDER — ACETAMINOPHEN 650 MG/1
650 SUPPOSITORY RECTAL EVERY 6 HOURS PRN
Status: DISCONTINUED | OUTPATIENT
Start: 2023-02-24 | End: 2023-02-25 | Stop reason: HOSPADM

## 2023-02-24 RX ADMIN — POTASSIUM CHLORIDE 10 MEQ: 7.46 INJECTION, SOLUTION INTRAVENOUS at 05:58

## 2023-02-24 RX ADMIN — METRONIDAZOLE 500 MG: 500 INJECTION, SOLUTION INTRAVENOUS at 08:27

## 2023-02-24 RX ADMIN — SODIUM CHLORIDE, POTASSIUM CHLORIDE, SODIUM LACTATE AND CALCIUM CHLORIDE: 600; 310; 30; 20 INJECTION, SOLUTION INTRAVENOUS at 22:35

## 2023-02-24 RX ADMIN — VANCOMYCIN HYDROCHLORIDE 1250 MG: 10 INJECTION, POWDER, LYOPHILIZED, FOR SOLUTION INTRAVENOUS at 10:34

## 2023-02-24 RX ADMIN — CEFEPIME 2000 MG: 2 INJECTION, POWDER, FOR SOLUTION INTRAVENOUS at 20:44

## 2023-02-24 RX ADMIN — THIAMINE HYDROCHLORIDE 200 MG: 100 INJECTION, SOLUTION INTRAMUSCULAR; INTRAVENOUS at 11:31

## 2023-02-24 RX ADMIN — POTASSIUM CHLORIDE 10 MEQ: 7.46 INJECTION, SOLUTION INTRAVENOUS at 02:22

## 2023-02-24 RX ADMIN — POTASSIUM CHLORIDE 10 MEQ: 7.46 INJECTION, SOLUTION INTRAVENOUS at 04:54

## 2023-02-24 RX ADMIN — PANTOPRAZOLE SODIUM 40 MG: 40 INJECTION, POWDER, FOR SOLUTION INTRAVENOUS at 11:31

## 2023-02-24 RX ADMIN — SODIUM CHLORIDE, PRESERVATIVE FREE 10 ML: 5 INJECTION INTRAVENOUS at 11:31

## 2023-02-24 RX ADMIN — SODIUM CHLORIDE, PRESERVATIVE FREE 10 ML: 5 INJECTION INTRAVENOUS at 20:48

## 2023-02-24 RX ADMIN — METRONIDAZOLE 500 MG: 500 INJECTION, SOLUTION INTRAVENOUS at 22:02

## 2023-02-24 RX ADMIN — PANTOPRAZOLE SODIUM 80 MG: 40 INJECTION, POWDER, LYOPHILIZED, FOR SOLUTION INTRAVENOUS at 01:36

## 2023-02-24 RX ADMIN — SODIUM CHLORIDE, POTASSIUM CHLORIDE, SODIUM LACTATE AND CALCIUM CHLORIDE: 600; 310; 30; 20 INJECTION, SOLUTION INTRAVENOUS at 05:22

## 2023-02-24 RX ADMIN — SODIUM CHLORIDE, POTASSIUM CHLORIDE, SODIUM LACTATE AND CALCIUM CHLORIDE 1500 ML: 600; 310; 30; 20 INJECTION, SOLUTION INTRAVENOUS at 03:17

## 2023-02-24 RX ADMIN — ACETAMINOPHEN 650 MG: 325 TABLET ORAL at 23:01

## 2023-02-24 RX ADMIN — ACETAMINOPHEN 650 MG: 325 TABLET ORAL at 11:09

## 2023-02-24 RX ADMIN — ONDANSETRON 4 MG: 2 INJECTION INTRAMUSCULAR; INTRAVENOUS at 01:34

## 2023-02-24 RX ADMIN — CEFEPIME 2000 MG: 2 INJECTION, POWDER, FOR SOLUTION INTRAVENOUS at 07:04

## 2023-02-24 RX ADMIN — INSULIN LISPRO 2 UNITS: 100 INJECTION, SOLUTION INTRAVENOUS; SUBCUTANEOUS at 17:18

## 2023-02-24 RX ADMIN — METRONIDAZOLE 500 MG: 500 INJECTION, SOLUTION INTRAVENOUS at 14:23

## 2023-02-24 RX ADMIN — POTASSIUM CHLORIDE 10 MEQ: 7.46 INJECTION, SOLUTION INTRAVENOUS at 03:47

## 2023-02-24 RX ADMIN — INSULIN LISPRO 4 UNITS: 100 INJECTION, SOLUTION INTRAVENOUS; SUBCUTANEOUS at 11:09

## 2023-02-24 RX ADMIN — SODIUM CHLORIDE, POTASSIUM CHLORIDE, SODIUM LACTATE AND CALCIUM CHLORIDE: 600; 310; 30; 20 INJECTION, SOLUTION INTRAVENOUS at 10:32

## 2023-02-24 RX ADMIN — SODIUM CHLORIDE, POTASSIUM CHLORIDE, SODIUM LACTATE AND CALCIUM CHLORIDE 1000 ML: 600; 310; 30; 20 INJECTION, SOLUTION INTRAVENOUS at 01:32

## 2023-02-24 RX ADMIN — SODIUM CHLORIDE: 9 INJECTION, SOLUTION INTRAVENOUS at 06:57

## 2023-02-24 RX ADMIN — INSULIN GLARGINE 10 UNITS: 100 INJECTION, SOLUTION SUBCUTANEOUS at 11:09

## 2023-02-24 RX ADMIN — ACETAMINOPHEN 650 MG: 325 TABLET ORAL at 17:00

## 2023-02-24 RX ADMIN — BENZONATATE 100 MG: 100 CAPSULE ORAL at 23:01

## 2023-02-24 RX ADMIN — GUAIFENESIN 600 MG: 600 TABLET, EXTENDED RELEASE ORAL at 23:18

## 2023-02-24 ASSESSMENT — PAIN DESCRIPTION - LOCATION
LOCATION: THROAT

## 2023-02-24 ASSESSMENT — ENCOUNTER SYMPTOMS
SHORTNESS OF BREATH: 0
VOMITING: 1
NAUSEA: 1
COUGH: 0
ABDOMINAL PAIN: 0
DIARRHEA: 0
RHINORRHEA: 0

## 2023-02-24 ASSESSMENT — PAIN DESCRIPTION - DESCRIPTORS
DESCRIPTORS: SORE
DESCRIPTORS: SORE

## 2023-02-24 ASSESSMENT — PAIN SCALES - GENERAL
PAINLEVEL_OUTOF10: 0
PAINLEVEL_OUTOF10: 0
PAINLEVEL_OUTOF10: 4
PAINLEVEL_OUTOF10: 9
PAINLEVEL_OUTOF10: 9
PAINLEVEL_OUTOF10: 0
PAINLEVEL_OUTOF10: 0
PAINLEVEL_OUTOF10: 4

## 2023-02-24 ASSESSMENT — PAIN - FUNCTIONAL ASSESSMENT
PAIN_FUNCTIONAL_ASSESSMENT: ACTIVITIES ARE NOT PREVENTED
PAIN_FUNCTIONAL_ASSESSMENT: ACTIVITIES ARE NOT PREVENTED
PAIN_FUNCTIONAL_ASSESSMENT: NONE - DENIES PAIN
PAIN_FUNCTIONAL_ASSESSMENT: NONE - DENIES PAIN

## 2023-02-24 ASSESSMENT — PAIN DESCRIPTION - FREQUENCY: FREQUENCY: INTERMITTENT

## 2023-02-24 ASSESSMENT — PAIN DESCRIPTION - DIRECTION: RADIATING_TOWARDS: NO

## 2023-02-24 ASSESSMENT — PAIN DESCRIPTION - ONSET: ONSET: ON-GOING

## 2023-02-24 ASSESSMENT — PAIN DESCRIPTION - ORIENTATION
ORIENTATION: INNER
ORIENTATION: INNER

## 2023-02-24 ASSESSMENT — PAIN DESCRIPTION - PAIN TYPE: TYPE: ACUTE PAIN
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
## 2023-02-24 NOTE — ED PROVIDER NOTES
In addition to the advanced practice provider, I personally saw Nadege Hurst and performed a substantive portion of the visit including all aspects of the medical decision making. Briefly, this is a 50 y.o. male here for altered mental status. Patient arrives somnolent and unable to contribute much meaningfully to history. Spouse present at bedside, states that the patient seemed normal until he went out this evening with friends for a party. Spouse states the patient drank a large amount of alcohol, on returning home he seemed confused. He then fell forward and struck his face against the ground. After falling he began to vomit black material at which point spouse called 911 to seek emergency care. Spouse states the patient typically never drinks alcohol. No recreational drug use. He has history of insulin-dependent diabetes. .    On exam, patient afebrile however ill-appearing. No anders painful or respiratory distress. He is somnolent, opens eyes to sternal rub and able to track examiner with eyes before rapidly falling back asleep. Head normocephalic, atraumatic. No meningismus. Heart tachycardic, regular rhythm. Lungs CTAB. Abdomen soft, nondistended, no distress elicited with deep palpation all quadrants. Localizes noxious stimuli. EKG  EKG was reviewed by emergency department physician in the absence of a cardiologist    Narrow complex sinus rhythm, rate 127, normal axis, normal FL and QRS intervals, normal Qtc, no ST elevations, ST depressions V3-V5, normal t-wave morphology, impression sinus tachycardia with nonspecific ST morphology, no STEMI      Screenings   V[place] Coma Scale  Eye Opening: To pain  Best Verbal Response: Confused  Best Motor Response: Localizes pain  [place] Coma Scale Score: 11          MDM    Patient afebrile however ill-appearing. He is in no outward evidence of distress. Somnolent, however arousable and protecting his airway.   No hypoxia or increased work of breathing. No anticipated benefit from emergent intubation at this time. No hypoglycemia. Neuro exam without clear focal deficit, very low suspicion for CVA/TIA. CT head without evidence of hemorrhage or mass effect. CT cervical spine nonacute. Patient does appear intoxicated, EtOH level returns at 196. Aspirin and acetaminophen levels negative. Laboratory evaluation also with leukocytosis and pronounced lactic acidosis. Patient does have metabolic acidosis on VBG with hyperglycemia, however bicarbonate only minimally low, I suspect acidosis from lactic acid elevation, not clearly consistent with DKA. Creatinine 2.0, no baseline for comparison however I suspect acute kidney injury, likely prerenal.  Patient also with hypokalemia, given electrolyte derangement and lowers patient for DKA, will defer insulin drip for the time being. Given patient's encephalopathy and leukocytosis, CT imaging of the chest and abdomen was pursued which shows no evidence of acute infectious process. No other traumatic injuries. Very low suspicion for CNS infection. EKG with sinus tachycardia, no STEMI, there are nonspecific ST depressions concerning for subendocardial ischemia likely secondary to rate related demand. Troponin minimally elevated 0.02. Potential hematemesis observed by wife, will defer aspirin for the time being. No further episodes of vomiting were observed in the emergency department. I suspect encephalopathy and type B lactic acidosis secondary to acute alcohol intoxication. Patient received aggressive fluid resuscitation, Zofran and Protonix. Repeat lactic acid was modestly improved with fluids. No obvious source of infection, will defer antibiotics for the time being although would have low threshold to treat empirically if patient develops fevers or worsening status clinically. Case discussed with internal medicine team who will plan to admit for further evaluation and care.     I Dr. Adrienne Galarza am the primary clinician of record. Critical Care:    I have discussed the case with the advanced practice provider. I have personally performed a history, physical exam, and my own medical decision making. I have reviewed the note and agree with the findings and plan. Upon my evaluation, this patient had a high probability of imminent or life-threatening deterioration due to acute encephalopathy, severe lactic acidosis which required my direct attention, intervention, and personal management. I personally saw the patient and independently provided 35 minutes of non-concurrent critical care out of the total shared critical care time provided. The critical care time spent while evaluating and treating this patient was exclusive of any time spent doing separately billable procedures. This critical care time includes time at the bedside, data interpretation, medication management, monitoring for potential decompensation and physician consultation. Specifics of interventions taken and potentially life-threatening diagnostic considerations are listed above in the medical decision making. Patient Referrals:  No follow-up provider specified. Discharge Medications:  New Prescriptions    No medications on file       FINAL IMPRESSION  1. Acute alcoholic intoxication with complication ([facility] [address]    2. Hypokalemia    3. Lactic acidosis    4. Elevated troponin    5. Leukocytosis, unspecified type    6. Acute kidney injury ([address]    7. Acute encephalopathy        Blood pressure 112/68, pulse (!) 131, resp. rate 16, height 5' 4\" (1.626 m), weight 154 lb 5.2 oz (70 kg), SpO2 100 %. For further details of [facility] emergency department encounter, please see documentation by advanced practice provider, JUNIOR Solorzano.     Jordon Albrecht DO (electronically signed)  Attending Emergency Physician       Jordon Albrecht DO  02/24/23 [id number]
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
## 2023-02-24 NOTE — CARE COORDINATION
Discharge Planning Note:    Chart reviewed and it appears that patient has minimal needs for discharge at this time. Please reach out to case management if any discharge needs are identified. Case management will continue to follow progress and update discharge plan as needed. Pt has pending PT/OT evals. Pt is noted to be self-pay, financial consult placed. CM to follow.     Electronically signed by Delores Power RN on 2/24/2023 at 9:12 AM
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
## 2023-02-24 NOTE — CONSULTS
Clinical Pharmacy Note: Pharmacy to Dose Vancomycin    Kathryn Bean is a 50 y.o. male started on Vancomycin for severe sepsis; consult received from Dr. Juliana Lafleur to manage therapy. Also receiving the following antibiotics: cefepime, metronidazole. Goal AUC: 400-600 mg/L*hr  Goal Trough Level: 15 mcg/mL    Assessment/Plan:  Patient currently with RAJESH  Will give vancomycin 1250 mg x1 today and dose vancomycin intermittently until renal function improvement  A vancomycin random level has been ordered for 2/25 at 0600  Changes in regimen will be determined based on culture results, renal function, and clinical response. Pharmacy will continue to monitor and adjust regimen as necessary. Allergies:  Patient has no known allergies. Recent Labs     02/24/23  0119   CREATININE 2.0*       Recent Labs     02/24/23  0119 02/24/23  0648   WBC 28.7* 23.4*       Ht Readings from Last 1 Encounters:   02/24/23 5' 6\" (1.676 m)        Wt Readings from Last 1 Encounters:   02/24/23 163 lb 5.8 oz (74.1 kg)         Estimated Creatinine Clearance: 40 mL/min (A) (based on SCr of 2 mg/dL (H)).       Thank you for the consult,    Walt Christy, EllieD, BCPS  [id number]  2/24/2023 7:17 AM

## 2023-02-25 VITALS
WEIGHT: 166.23 LBS | RESPIRATION RATE: 33 BRPM | HEIGHT: 66 IN | DIASTOLIC BLOOD PRESSURE: 110 MMHG | HEART RATE: 115 BPM | OXYGEN SATURATION: 91 % | SYSTOLIC BLOOD PRESSURE: 165 MMHG | BODY MASS INDEX: 26.71 KG/M2 | TEMPERATURE: 98.5 F

## 2023-02-25 LAB
ALBUMIN SERPL-MCNC: 3.4 G/DL (ref 3.4–5)
ANION GAP SERPL CALCULATED.3IONS-SCNC: 11 MMOL/L (ref 3–16)
BASOPHILS ABSOLUTE: 0.1 K/UL (ref 0–0.2)
BASOPHILS RELATIVE PERCENT: 0.5 %
BLOOD CULTURE, ROUTINE: NORMAL
BUN BLDV-MCNC: 15 MG/DL (ref 7–20)
CALCIUM SERPL-MCNC: 9 MG/DL (ref 8.3–10.6)
CHLORIDE BLD-SCNC: 104 MMOL/L (ref 99–110)
CO2: 26 MMOL/L (ref 21–32)
CREAT SERPL-MCNC: 1 MG/DL (ref 0.9–1.3)
CULTURE, BLOOD 2: NORMAL
EOSINOPHILS ABSOLUTE: 0 K/UL (ref 0–0.6)
EOSINOPHILS RELATIVE PERCENT: 0 %
ESTIMATED AVERAGE GLUCOSE: 194.4 MG/DL
GFR SERPL CREATININE-BSD FRML MDRD: >60 ML/MIN/{1.73_M2}
GLUCOSE BLD-MCNC: 140 MG/DL (ref 70–99)
GLUCOSE BLD-MCNC: 166 MG/DL (ref 70–99)
GLUCOSE BLD-MCNC: 169 MG/DL (ref 70–99)
GLUCOSE BLD-MCNC: 172 MG/DL (ref 70–99)
GLUCOSE BLD-MCNC: 186 MG/DL (ref 70–99)
HBA1C MFR BLD: 8.4 %
HCT VFR BLD CALC: 39.1 % (ref 40.5–52.5)
HEMOGLOBIN: 12.9 G/DL (ref 13.5–17.5)
LACTIC ACID: 1.6 MMOL/L (ref 0.4–2)
LACTIC ACID: 2.2 MMOL/L (ref 0.4–2)
LACTIC ACID: 2.2 MMOL/L (ref 0.4–2)
LYMPHOCYTES ABSOLUTE: 1.9 K/UL (ref 1–5.1)
LYMPHOCYTES RELATIVE PERCENT: 13 %
MAGNESIUM: 2.2 MG/DL (ref 1.8–2.4)
MCH RBC QN AUTO: 29.4 PG (ref 26–34)
MCHC RBC AUTO-ENTMCNC: 33 G/DL (ref 31–36)
MCV RBC AUTO: 89 FL (ref 80–100)
MONOCYTES ABSOLUTE: 1.2 K/UL (ref 0–1.3)
MONOCYTES RELATIVE PERCENT: 8.3 %
NEUTROPHILS ABSOLUTE: 11.7 K/UL (ref 1.7–7.7)
NEUTROPHILS RELATIVE PERCENT: 78.2 %
PDW BLD-RTO: 13.5 % (ref 12.4–15.4)
PERFORMED ON: ABNORMAL
PHOSPHORUS: 1.9 MG/DL (ref 2.5–4.9)
PLATELET # BLD: 185 K/UL (ref 135–450)
PMV BLD AUTO: 7.9 FL (ref 5–10.5)
POTASSIUM SERPL-SCNC: 4.6 MMOL/L (ref 3.5–5.1)
RBC # BLD: 4.39 M/UL (ref 4.2–5.9)
SODIUM BLD-SCNC: 141 MMOL/L (ref 136–145)
VANCOMYCIN RANDOM: <4 UG/ML
WBC # BLD: 15 K/UL (ref 4–11)

## 2023-02-25 PROCEDURE — 6370000000 HC RX 637 (ALT 250 FOR IP): Performed by: INTERNAL MEDICINE

## 2023-02-25 PROCEDURE — 80202 ASSAY OF VANCOMYCIN: CPT

## 2023-02-25 PROCEDURE — 2500000003 HC RX 250 WO HCPCS: Performed by: INTERNAL MEDICINE

## 2023-02-25 PROCEDURE — 6360000002 HC RX W HCPCS: Performed by: STUDENT IN AN ORGANIZED HEALTH CARE EDUCATION/TRAINING PROGRAM

## 2023-02-25 PROCEDURE — 83605 ASSAY OF LACTIC ACID: CPT

## 2023-02-25 PROCEDURE — 36415 COLL VENOUS BLD VENIPUNCTURE: CPT

## 2023-02-25 PROCEDURE — 6370000000 HC RX 637 (ALT 250 FOR IP): Performed by: STUDENT IN AN ORGANIZED HEALTH CARE EDUCATION/TRAINING PROGRAM

## 2023-02-25 PROCEDURE — 6360000002 HC RX W HCPCS: Performed by: INTERNAL MEDICINE

## 2023-02-25 PROCEDURE — 80069 RENAL FUNCTION PANEL: CPT

## 2023-02-25 PROCEDURE — C9113 INJ PANTOPRAZOLE SODIUM, VIA: HCPCS | Performed by: STUDENT IN AN ORGANIZED HEALTH CARE EDUCATION/TRAINING PROGRAM

## 2023-02-25 PROCEDURE — 2580000003 HC RX 258: Performed by: STUDENT IN AN ORGANIZED HEALTH CARE EDUCATION/TRAINING PROGRAM

## 2023-02-25 PROCEDURE — 85025 COMPLETE CBC W/AUTO DIFF WBC: CPT

## 2023-02-25 PROCEDURE — 2580000003 HC RX 258: Performed by: INTERNAL MEDICINE

## 2023-02-25 PROCEDURE — 2500000003 HC RX 250 WO HCPCS: Performed by: STUDENT IN AN ORGANIZED HEALTH CARE EDUCATION/TRAINING PROGRAM

## 2023-02-25 PROCEDURE — 83735 ASSAY OF MAGNESIUM: CPT

## 2023-02-25 RX ORDER — HYDRALAZINE HYDROCHLORIDE 20 MG/ML
10 INJECTION INTRAMUSCULAR; INTRAVENOUS EVERY 6 HOURS PRN
Status: DISCONTINUED | OUTPATIENT
Start: 2023-02-25 | End: 2023-02-25 | Stop reason: HOSPADM

## 2023-02-25 RX ORDER — FAMOTIDINE 20 MG/1
20 TABLET, FILM COATED ORAL 2 TIMES DAILY
Qty: 60 TABLET | Refills: 3 | Status: SHIPPED | OUTPATIENT
Start: 2023-02-25

## 2023-02-25 RX ORDER — AMLODIPINE BESYLATE 10 MG/1
10 TABLET ORAL ONCE
Qty: 30 TABLET | Refills: 3 | Status: SHIPPED | OUTPATIENT
Start: 2023-02-25 | End: 2023-02-25

## 2023-02-25 RX ORDER — AMLODIPINE BESYLATE 5 MG/1
10 TABLET ORAL ONCE
Status: COMPLETED | OUTPATIENT
Start: 2023-02-25 | End: 2023-02-25

## 2023-02-25 RX ORDER — AMLODIPINE BESYLATE 5 MG/1
5 TABLET ORAL DAILY
Qty: 30 TABLET | Refills: 0 | Status: SHIPPED | OUTPATIENT
Start: 2023-02-25 | End: 2023-02-25 | Stop reason: HOSPADM

## 2023-02-25 RX ORDER — BENZONATATE 100 MG/1
100 CAPSULE ORAL 3 TIMES DAILY PRN
Qty: 30 CAPSULE | Refills: 0 | Status: SHIPPED | OUTPATIENT
Start: 2023-02-25 | End: 2023-03-04

## 2023-02-25 RX ADMIN — PANTOPRAZOLE SODIUM 40 MG: 40 INJECTION, POWDER, FOR SOLUTION INTRAVENOUS at 08:54

## 2023-02-25 RX ADMIN — SODIUM CHLORIDE, PRESERVATIVE FREE 10 ML: 5 INJECTION INTRAVENOUS at 08:54

## 2023-02-25 RX ADMIN — HYDRALAZINE HYDROCHLORIDE 10 MG: 20 INJECTION INTRAMUSCULAR; INTRAVENOUS at 12:07

## 2023-02-25 RX ADMIN — SODIUM PHOSPHATE, MONOBASIC, MONOHYDRATE AND SODIUM PHOSPHATE, DIBASIC, ANHYDROUS 20 MMOL: 142; 276 INJECTION, SOLUTION INTRAVENOUS at 10:46

## 2023-02-25 RX ADMIN — HYDRALAZINE HYDROCHLORIDE 10 MG: 20 INJECTION INTRAMUSCULAR; INTRAVENOUS at 03:56

## 2023-02-25 RX ADMIN — VANCOMYCIN HYDROCHLORIDE 1000 MG: 1 INJECTION, POWDER, LYOPHILIZED, FOR SOLUTION INTRAVENOUS at 08:48

## 2023-02-25 RX ADMIN — METRONIDAZOLE 500 MG: 500 INJECTION, SOLUTION INTRAVENOUS at 05:42

## 2023-02-25 RX ADMIN — ACETAMINOPHEN 650 MG: 325 TABLET ORAL at 05:35

## 2023-02-25 RX ADMIN — BENZONATATE 100 MG: 100 CAPSULE ORAL at 10:52

## 2023-02-25 RX ADMIN — THIAMINE HYDROCHLORIDE 200 MG: 100 INJECTION, SOLUTION INTRAMUSCULAR; INTRAVENOUS at 08:54

## 2023-02-25 RX ADMIN — BENZONATATE 100 MG: 100 CAPSULE ORAL at 04:01

## 2023-02-25 RX ADMIN — SODIUM CHLORIDE, POTASSIUM CHLORIDE, SODIUM LACTATE AND CALCIUM CHLORIDE: 600; 310; 30; 20 INJECTION, SOLUTION INTRAVENOUS at 03:00

## 2023-02-25 RX ADMIN — INSULIN GLARGINE 10 UNITS: 100 INJECTION, SOLUTION SUBCUTANEOUS at 08:55

## 2023-02-25 RX ADMIN — AMLODIPINE BESYLATE 10 MG: 5 TABLET ORAL at 13:26

## 2023-02-25 RX ADMIN — CEFEPIME 2000 MG: 2 INJECTION, POWDER, FOR SOLUTION INTRAVENOUS at 06:06

## 2023-02-25 ASSESSMENT — PAIN DESCRIPTION - LOCATION
LOCATION: THROAT
LOCATION: THROAT

## 2023-02-25 ASSESSMENT — PAIN SCALES - GENERAL
PAINLEVEL_OUTOF10: 10
PAINLEVEL_OUTOF10: 0
PAINLEVEL_OUTOF10: 8
PAINLEVEL_OUTOF10: 0

## 2023-02-25 ASSESSMENT — PAIN - FUNCTIONAL ASSESSMENT
PAIN_FUNCTIONAL_ASSESSMENT: ACTIVITIES ARE NOT PREVENTED
PAIN_FUNCTIONAL_ASSESSMENT: ACTIVITIES ARE NOT PREVENTED

## 2023-02-25 ASSESSMENT — PAIN DESCRIPTION - ONSET
ONSET: OTHER (COMMENT)
ONSET: ON-GOING

## 2023-02-25 ASSESSMENT — PAIN DESCRIPTION - DIRECTION: RADIATING_TOWARDS: NONR

## 2023-02-25 ASSESSMENT — PAIN DESCRIPTION - PAIN TYPE
TYPE: ACUTE PAIN
TYPE: ACUTE PAIN

## 2023-02-25 ASSESSMENT — PAIN DESCRIPTION - FREQUENCY
FREQUENCY: INTERMITTENT
FREQUENCY: INTERMITTENT

## 2023-02-25 ASSESSMENT — PAIN DESCRIPTION - ORIENTATION
ORIENTATION: INNER
ORIENTATION: INNER

## 2023-02-25 ASSESSMENT — PAIN DESCRIPTION - DESCRIPTORS
DESCRIPTORS: SORE
DESCRIPTORS: SORE

## 2023-02-25 NOTE — DISCHARGE INSTR - COC
Continuity of Care Form    Patient Name: Adams Genao   :  1975  MRN:  [id number]    Admit date:  2023  Discharge date:  ***    Code Status Order: Full Code   Advance Directives:     Admitting Physician:  Mae Brambila DO  PCP: No primary care provider on file. Discharging Nurse: [facility] Unit/Room#: [id number]/[id number]  Discharging Unit Phone Number: ***    Emergency Contact:   Extended Emergency Contact Information  Primary Emergency Contact: [address] Phone: [phone]  Relation: Spouse    Past Surgical History:  History reviewed. No pertinent surgical history. Immunization History: There is no immunization history on file for this patient.     Active Problems:  Patient Active Problem List   Diagnosis Code    Type 2 diabetes mellitus ([facility] [address] E11.9    Hypertension associated with diabetes ([facility] [facility] E11.59, I15.2    Severe sepsis ([facility] [address] [facility] A41.9, R65.20    Hyperlipidemia associated with type 2 diabetes mellitus ([facility] [address] [facility] E11.69, E78.5    RAJESH (acute kidney injury) ([facility] [address] [facility] N17.9    Alcohol intoxication ([facility] [address] [facility] F02.861    Acute metabolic encephalopathy W63.66    Lactic acid acidosis E87.20       Isolation/Infection:   Isolation            No Isolation          Patient Infection Status       None to display            Nurse Assessment:  Last Vital Signs: BP (!) 146/100   Pulse (!) 102   Temp 97.9 °F (36.6 °C) (Temporal)   Resp 17   Ht 5' 6\" (1.676 m)   Wt 166 lb 3.6 oz (75.4 kg)   SpO2 97%   BMI 26.83 kg/m²     Last documented pain score (0-10 scale): Pain Level: 0  Last Weight:   Wt Readings from Last 1 Encounters:   23 166 lb 3.6 oz (75.4 kg)     Mental Status:  {IP PT MENTAL STATUS:}    IV Access:  [facility] JANNETTE IV ACCESS:021359295}    Nursing Mobility/ADLs:  Walking   {CHP DME MOVW:127871149}  Transfer  {CHP DME OIRX:933292708}  Bathing  {CHP DME DPPH:750952423}  Dressing  {CHP DME OTBK:392297401}  Toileting  {CHANDLER SCOTT WXTJ:839986385}  Feeding  {CHANDLER DME SQWJ:656502213}  Med Admin  {[facility] DME YPIT:787472253}  Med Delivery   { JANNETTE MED Delivery:534004884}    Wound Care Documentation and Therapy:        Elimination:  Continence: Bowel: {YES / DD:86496}  Bladder: {YES / :54213}  Urinary Catheter: {Urinary Catheter:100435861}   Colostomy/Ileostomy/Ileal Conduit: {YES / AD:63987}       Date of Last BM: ***    Intake/Output Summary (Last 24 hours) at 2023 1115  Last data filed at 2023 0854  Gross per 24 hour   Intake 4135.75 ml   Output 425 ml   Net 3710.75 ml     I/O last 3 completed shifts: In: 4125.8 [P.O.:240;  I.V.:2290.6; Other:10; IV Piggyback:1585.1]  Out: 1425 [Urine:1425]    Safety Concerns:     [address] [employer] Safety Concerns:583345216}    Impairments/Disabilities:      [address] [employer] Impairments/Disabilities:754583615}    Nutrition Therapy:  Current Nutrition Therapy:   [address] [employer] Diet List:214924506}    Routes of Feeding: {[facility] DME Other Feedings:289864279}  Liquids: {Slp liquid thickness:31966}  Daily Fluid Restriction: {[facility] DME Yes amt example:647866283}  Last Modified Barium Swallow with Video (Video Swallowing Test): {Done Not Done SKJK:352609754}    Treatments at the Time of Hospital Discharge:   Respiratory Treatments: ***  Oxygen Therapy:  {Therapy; copd oxygen:39134}  Ventilator:    { CC Vent SFOL:996406388}    Rehab Therapies: {THERAPEUTIC INTERVENTION:9138635723}  Weight Bearing Status/Restrictions: [address]  Weight Bearin}  Other Medical Equipment (for information only, NOT a DME order):  {EQUIPMENT:390690791}  Other Treatments: ***    Patient's personal belongings (please select all that are sent with patient):  {[facility] DME Belongings:106862616}    RN SIGNATURE:  {Esignature:767909188}    CASE MANAGEMENT/SOCIAL WORK SECTION    Inpatient Status Date: 2023    Readmission Risk Assessment Score:  Readmission Risk              Risk of Unplanned Readmission:  9           Discharging to Facility/ Agency     [facility]  [address]  Phone: [phone]  Appointment: **Call on Monday to schedule an appointment. / signature: Electronically signed by CRYSTAL Du on 2/25/2023 at 11:16 AM      PHYSICIAN SECTION    Prognosis: {Prognosis:0497228351}    Condition at Discharge: [address] Patient Condition:278132833}    Rehab Potential (if transferring to Rehab): {Prognosis:1643983774}    Recommended Labs or Other Treatments After Discharge: ***    Physician Certification: I certify the above information and transfer of Hua Lias  is necessary for the continuing treatment of the diagnosis listed and that he requires {Admit to Appropriate Level of Care:97807} for {GREATER/LESS:611384343} 30 days.      Update Admission H&P: {CHP DME Changes in PAVLJ:665831316}    PHYSICIAN SIGNATURE:  {Esignature:052433962}

## 2023-02-25 NOTE — PROGRESS NOTES
0356 am /108 right lower arm. Pt coughing and states throat is very sore. Scattered rhonchi and moist nonproductive cough. Encouraging pulmonary toilet. 0356 am Hydralazine 10 mg IVP given. Tessalon Capsule 100 mg given with water. 0427 am /97 left upper arm. Pt fell back to sleep.

## 2023-02-25 NOTE — PROGRESS NOTES
BP has been elevated several times. 161/104, 167/102. Message sent through [address] [place] [address] to verify if doctor wishes to dc the Lactated Ringers and order a prn antihypertensive medication.

## 2023-02-25 NOTE — PROGRESS NOTES
Assisted up to chair. 88% on RA. Replaced 2LO2NC. RR 24-25. . /91. Patient states his throat is sore. Next tylenol due at 2300. Will inquire with doctor re: cough med. Wife at side of chair with pt. Call light in reach.

## 2023-02-25 NOTE — DISCHARGE INSTR - DIET
Good nutrition is important when healing from an illness, injury, or surgery. Follow any nutrition recommendations given to you during your hospital stay. If you were given an oral nutrition supplement while in the hospital, continue to take this supplement at home. You can take it with meals, in-between meals, and/or before bedtime. These supplements can be purchased at most local grocery stores, pharmacies, and chain [employer]-stores. If you have any questions about your diet or nutrition, call the hospital and ask for the dietitian.   Advance as tolerated

## 2023-02-25 NOTE — PROGRESS NOTES
Clinical Pharmacy Note: Pharmacy to Dose Vancomycin    Vancomycin Day: 2  Indication: sepsis  Current Dose: intermittent dosing  Dosing Method: Bayesian Modeling    Random: <4 mg/L    Recent Labs     02/24/23  0648 02/25/23  0501   BUN 20 15       Recent Labs     02/24/23  0648 02/25/23  0501   CREATININE 1.6* 1.0       Recent Labs     02/24/23  0648 02/25/23  0501   WBC 23.4* 15.0*         Intake/Output Summary (Last 24 hours) at 2/25/2023 0703  Last data filed at 2/25/2023 8392  Gross per 24 hour   Intake 4125.75 ml   Output 1425 ml   Net 2700.75 ml         Ht Readings from Last 1 Encounters:   02/24/23 5' 6\" (1.676 m)        Wt Readings from Last 1 Encounters:   02/25/23 166 lb 3.6 oz (75.4 kg)         Body mass index is 26.83 kg/m². Estimated Creatinine Clearance: 81 mL/min (based on SCr of 1 mg/dL). Assessment/Plan:  Vancomycin level is subtherapeutic due to improved clearance, RAJESH resolving   Increase vancomycin regimen to 1000 mg every 12 hours. Bayesian Modeling predicts an AUC of 516 mg/L*hr and trough of 16.2 mg/L. A vancomycin random level has been ordered on 2/26 at 0600 for follow-up. Changes in regimen will be determined based on culture results, renal function, and clinical response. Pharmacy will continue to monitor and adjust regimen as necessary. Thank you for the consult,    Tammy Neri, PharmD.   PGY-1 Resident  [id number]  02/25/23 7:05 AM

## 2023-02-25 NOTE — PROGRESS NOTES
Discharge instructions reviewed with wife and patient. IVs removed. All questions answered,  Patient given four prescriptions. Take out per wheelchair.

## 2023-02-25 NOTE — PROGRESS NOTES
Patient sitting up in chair, states he has a sore throat. Lungs w/rhonchi and diminished. Has congested cough and states he is unable to bring up any phlegm. Has yaunker at bedside and is using to suction thick white secretions. ST on monitor and BP elevated. Patient coughing and choking.
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
## 2023-02-25 NOTE — PROGRESS NOTES
Nursing assessment completed. Temp 98. Afebrile. Sinus Tachy 103. RR 16. /105 (118) earlier at 2000 pm.  Rechecked now 135/91. Patient repositioned himself up in bed. Only c/o sore throat. Hot Tea provided. Scattered rhonchi and loose nonproductive cough. Encouraged pulmonary toilet. Tissues provided. Next tylenol due if patient needs at 2300. And patient and his family informed. Reviewed medications. Provided nutrition for both patient and his wife. SR up x3. Call light in reach. Bed in lowest position. Wheels locked. Bed alarm engaged. Patient reminded to call nurse for all his needs.

## 2023-02-25 NOTE — PROGRESS NOTES
Spoke with Dr Kandi Beavers regarding sore throat. Orders given. Patient states he needs \"pain pill\". Patient offered tylenol and refused. Patient given popsicle.

## 2023-02-25 NOTE — CARE COORDINATION
Discharge Planning:     (CM) reviewed patient's chart which states that patient has no health insurance. The following [address] [place] [address] appointment was scheduled for patient for continuity of care: [facility] [address] [facility]  [facility]  Phone: [phone]  Appointment: **Call Monday for an appointment. CM spoke with the patient and Wife who agreed to the above follow-up appointment. Wife requested that a financial counselor call her to assist her with getting insurance for the patient. CM left a voicemail for financial counselor, Nereyda Munoz, requesting that she call patient's Wife on Monday to follow-up with this. All CM discharge needs met at this time. Medical staff to inform CM team if additional discharge needs arise.        Kan ROJAS, JUDY, [facility] -   [phone]    Electronically signed by CRYSTAL Albert on 2/25/2023 at 11:58 AM

## 2023-02-26 LAB — MRSA SCREEN RT-PCR: NORMAL

## 2023-02-28 LAB
BLOOD CULTURE, ROUTINE: NORMAL
CULTURE, BLOOD 2: NORMAL